# Patient Record
Sex: FEMALE | Race: WHITE | NOT HISPANIC OR LATINO | URBAN - METROPOLITAN AREA
[De-identification: names, ages, dates, MRNs, and addresses within clinical notes are randomized per-mention and may not be internally consistent; named-entity substitution may affect disease eponyms.]

---

## 2021-12-02 ENCOUNTER — OUTPATIENT (OUTPATIENT)
Dept: OUTPATIENT SERVICES | Facility: HOSPITAL | Age: 21
LOS: 1 days | Discharge: HOME | End: 2021-12-02

## 2022-07-19 RX ORDER — ACETAMINOPHEN 500 MG
1000 TABLET ORAL ONCE
Refills: 0 | Status: DISCONTINUED | OUTPATIENT
Start: 2022-07-20 | End: 2022-07-20

## 2022-07-19 RX ORDER — ESCITALOPRAM OXALATE 10 MG/1
1 TABLET, FILM COATED ORAL
Qty: 0 | Refills: 0 | DISCHARGE

## 2022-07-19 RX ORDER — APREPITANT 80 MG/1
40 CAPSULE ORAL ONCE
Refills: 0 | Status: DISCONTINUED | OUTPATIENT
Start: 2022-07-20 | End: 2022-07-20

## 2022-07-19 NOTE — ASU PATIENT PROFILE, ADULT - NSICDXPASTMEDICALHX_GEN_ALL_CORE_FT
PAST MEDICAL HISTORY:  Anxiety and depression      PAST MEDICAL HISTORY:  Anxiety and depression     Congenital deformity of face

## 2022-07-19 NOTE — ASU PATIENT PROFILE, ADULT - FALL HARM RISK - UNIVERSAL INTERVENTIONS
Bed in lowest position, wheels locked, appropriate side rails in place/Call bell, personal items and telephone in reach/Instruct patient to call for assistance before getting out of bed or chair/Non-slip footwear when patient is out of bed/[place] to call system/Physically safe environment - no spills, clutter or unnecessary equipment/Purposeful Proactive Rounding/Room/bathroom lighting operational, light cord in reach

## 2022-07-20 ENCOUNTER — OUTPATIENT (OUTPATIENT)
Dept: OUTPATIENT SERVICES | Facility: HOSPITAL | Age: 22
LOS: 1 days | Discharge: HOME | End: 2022-07-20

## 2022-07-20 VITALS
DIASTOLIC BLOOD PRESSURE: 67 MMHG | SYSTOLIC BLOOD PRESSURE: 103 MMHG | RESPIRATION RATE: 16 BRPM | HEART RATE: 80 BPM | OXYGEN SATURATION: 99 % | HEIGHT: 62 IN | WEIGHT: 131.4 LBS | TEMPERATURE: 98 F

## 2022-07-20 VITALS
HEART RATE: 90 BPM | RESPIRATION RATE: 14 BRPM | SYSTOLIC BLOOD PRESSURE: 103 MMHG | DIASTOLIC BLOOD PRESSURE: 71 MMHG | OXYGEN SATURATION: 100 %

## 2022-07-20 DIAGNOSIS — Q18.9 CONGENITAL MALFORMATION OF FACE AND NECK, UNSPECIFIED: Chronic | ICD-10-CM

## 2022-07-20 RX ORDER — FENTANYL CITRATE 50 UG/ML
25 INJECTION INTRAVENOUS
Refills: 0 | Status: DISCONTINUED | OUTPATIENT
Start: 2022-07-20 | End: 2022-07-20

## 2022-07-20 RX ORDER — SODIUM CHLORIDE 9 MG/ML
1000 INJECTION, SOLUTION INTRAVENOUS
Refills: 0 | Status: DISCONTINUED | OUTPATIENT
Start: 2022-07-20 | End: 2022-07-20

## 2022-07-20 RX ORDER — ONDANSETRON 8 MG/1
4 TABLET, FILM COATED ORAL ONCE
Refills: 0 | Status: DISCONTINUED | OUTPATIENT
Start: 2022-07-20 | End: 2022-07-20

## 2022-07-20 RX ORDER — KETOROLAC TROMETHAMINE 30 MG/ML
30 SYRINGE (ML) INJECTION ONCE
Refills: 0 | Status: DISCONTINUED | OUTPATIENT
Start: 2022-07-20 | End: 2022-07-20

## 2022-07-20 RX ADMIN — Medication 30 MILLIGRAM(S): at 19:59

## 2022-07-20 NOTE — BRIEF OPERATIVE NOTE - NSICDXBRIEFPROCEDURE_GEN_ALL_CORE_FT
PROCEDURES:  Repair, fistula, oronasal 20-Jul-2022 19:21:15  Christelle Us  Complex vestibuloplasty 20-Jul-2022 19:21:32  Christelle Us  Full-thickness skin graft to nose 20-Jul-2022 19:23:30  Christelle Us

## 2022-07-20 NOTE — BRIEF OPERATIVE NOTE - OPERATION/FINDINGS
[place] fistula repair, vestibuloplasty with full thickness skin graft from left post auricle.

## 2022-08-02 PROBLEM — Q18.9: Chronic | Status: ACTIVE | Noted: 2022-07-19

## 2022-08-02 PROBLEM — F41.9 ANXIETY DISORDER, UNSPECIFIED: Chronic | Status: ACTIVE | Noted: 2022-07-19

## 2022-09-06 NOTE — ASU DISCHARGE PLAN (ADULT/PEDIATRIC) - NS MD DC FALL RISK RISK
LOV 8/3/22  NOV none    Lyrica- 7/22/22 #90 caps with 1 refill    Xanax- 7/22/22 #120 tabs   For information on Fall & Injury Prevention, visit: [email or web address] OR  [email or web address] OR  https://www.cdc.gov/steadi/patient.html

## 2022-10-12 ENCOUNTER — OFFICE (OUTPATIENT)
Dept: URBAN - METROPOLITAN AREA CLINIC 28 | Facility: CLINIC | Age: 22
Setting detail: OPHTHALMOLOGY
End: 2022-10-12
Payer: COMMERCIAL

## 2022-10-12 DIAGNOSIS — H00.14: ICD-10-CM

## 2022-10-12 PROCEDURE — 92002 INTRM OPH EXAM NEW PATIENT: CPT | Performed by: OPHTHALMOLOGY

## 2022-10-12 ASSESSMENT — CONFRONTATIONAL VISUAL FIELD TEST (CVF)
OD_FINDINGS: FULL
OS_FINDINGS: FULL

## 2022-10-12 ASSESSMENT — TONOMETRY
OS_IOP_MMHG: 13
OD_IOP_MMHG: 13

## 2022-10-13 ASSESSMENT — AXIALLENGTH_DERIVED
OD_AL: 24.9813
OS_AL: 24.7173

## 2022-10-13 ASSESSMENT — REFRACTION_AUTOREFRACTION
OD_CYLINDER: -0.50
OD_SPHERE: -3.00
OS_AXIS: 175
OD_AXIS: 179
OS_SPHERE: -2.75
OS_CYLINDER: -0.25

## 2022-10-13 ASSESSMENT — KERATOMETRY
OD_K2POWER_DIOPTERS: 43.75
OS_K1POWER_DIOPTERS: 43.25
OS_K2POWER_DIOPTERS: 44.00
OD_AXISANGLE_DEGREES: 093
OD_K1POWER_DIOPTERS: 43.00
OS_AXISANGLE_DEGREES: 081

## 2022-10-13 ASSESSMENT — VISUAL ACUITY
OS_BCVA: 20/20
OD_BCVA: 20/20

## 2022-10-13 ASSESSMENT — SPHEQUIV_DERIVED
OD_SPHEQUIV: -3.25
OS_SPHEQUIV: -2.875

## 2023-02-14 NOTE — ASU PATIENT PROFILE, ADULT - NS PREOP UNDERSTANDS INFO
No solid food/dairy/candy/gum after 9:30pm tonight, water allowed before 04:30am tomorrow morning  Patient to bring photo ID/insurance card; no jewelries/valuable/contact lens; dress in comfortable clothes; no smoking/alcohol drinking/drug today; escort must have a photo ID; address and call back number was given./yes

## 2023-02-14 NOTE — ASU PATIENT PROFILE, ADULT - NSICDXPASTSURGICALHX_GEN_ALL_CORE_FT
Internal Medicine PAST SURGICAL HISTORY:  Deformity, face, congenital Multiple surgeries    S/P nasal surgery internal nasal repair

## 2023-02-15 ENCOUNTER — OUTPATIENT (OUTPATIENT)
Dept: OUTPATIENT SERVICES | Facility: HOSPITAL | Age: 23
LOS: 1 days | Discharge: HOME | End: 2023-02-15

## 2023-02-15 VITALS
HEIGHT: 62 IN | DIASTOLIC BLOOD PRESSURE: 72 MMHG | SYSTOLIC BLOOD PRESSURE: 107 MMHG | OXYGEN SATURATION: 100 % | HEART RATE: 82 BPM | RESPIRATION RATE: 16 BRPM | TEMPERATURE: 97 F | WEIGHT: 132.28 LBS

## 2023-02-15 VITALS
HEART RATE: 79 BPM | SYSTOLIC BLOOD PRESSURE: 99 MMHG | DIASTOLIC BLOOD PRESSURE: 72 MMHG | OXYGEN SATURATION: 99 % | TEMPERATURE: 98 F | RESPIRATION RATE: 16 BRPM

## 2023-02-15 DIAGNOSIS — Q18.9 CONGENITAL MALFORMATION OF FACE AND NECK, UNSPECIFIED: Chronic | ICD-10-CM

## 2023-02-15 DIAGNOSIS — Z98.890 OTHER SPECIFIED POSTPROCEDURAL STATES: Chronic | ICD-10-CM

## 2023-02-15 RX ORDER — ONDANSETRON 8 MG/1
4 TABLET, FILM COATED ORAL ONCE
Refills: 0 | Status: DISCONTINUED | OUTPATIENT
Start: 2023-02-15 | End: 2023-02-15

## 2023-02-15 RX ORDER — ACETAMINOPHEN 500 MG
1000 TABLET ORAL ONCE
Refills: 0 | Status: COMPLETED | OUTPATIENT
Start: 2023-02-15 | End: 2023-02-15

## 2023-02-15 RX ORDER — ESCITALOPRAM OXALATE 10 MG/1
15 TABLET, FILM COATED ORAL
Qty: 0 | Refills: 0 | DISCHARGE

## 2023-02-15 RX ORDER — SODIUM CHLORIDE 9 MG/ML
1000 INJECTION, SOLUTION INTRAVENOUS
Refills: 0 | Status: DISCONTINUED | OUTPATIENT
Start: 2023-02-15 | End: 2023-02-15

## 2023-02-15 RX ORDER — APREPITANT 80 MG/1
40 CAPSULE ORAL ONCE
Refills: 0 | Status: COMPLETED | OUTPATIENT
Start: 2023-02-15 | End: 2023-02-15

## 2023-02-15 RX ORDER — FENTANYL CITRATE 50 UG/ML
25 INJECTION INTRAVENOUS
Refills: 0 | Status: DISCONTINUED | OUTPATIENT
Start: 2023-02-15 | End: 2023-02-15

## 2023-02-15 RX ADMIN — Medication 1000 MILLIGRAM(S): at 07:13

## 2023-02-15 RX ADMIN — APREPITANT 40 MILLIGRAM(S): 80 CAPSULE ORAL at 07:14

## 2023-02-15 NOTE — PRE-ANESTHESIA EVALUATION ADULT - NSANTHOSAYNRD_GEN_A_CORE
No. KASSY screening performed.  STOP BANG Legend: 0-2 = LOW Risk; 3-4 = INTERMEDIATE Risk; 5-8 = HIGH Risk

## 2023-02-15 NOTE — ASU PREOP CHECKLIST - IDENTIFICATION BAND VERIFIED
done

## 2023-02-15 NOTE — ASU DISCHARGE PLAN (ADULT/PEDIATRIC) - NS MD DC FALL RISK RISK
For information on Fall & Injury Prevention, visit: [email or web address] OR  [email or web address] OR  https://www.cdc.gov/steadi/patient.html

## 2023-07-03 NOTE — ASU PREOP CHECKLIST - INTERNAL PROSTHESES

## 2023-08-10 NOTE — BRIEF OPERATIVE NOTE - NSICDXBRIEFPROCEDURE_GEN_ALL_CORE_FT
Anesthesia Evaluation     Patient summary reviewed   history of anesthetic complications:  prolonged sedation  NPO Solid Status: > 8 hours  NPO Liquid Status: > 8 hours           Airway   Mallampati: II  TM distance: >3 FB  Neck ROM: full  No difficulty expected  Dental      Comment: Multiple missing teeth    Pulmonary - negative pulmonary ROS   (-) asthma, sleep apnea, not a smoker  Cardiovascular     (+) hypertension 2 medications or greaterhyperlipidemia      Neuro/Psych  (-) seizures, TIA, CVA  GI/Hepatic/Renal/Endo    (+) obesity, GERD  (-) liver disease, no renal disease, diabetes    ROS Comment: On metformin for weight loss    Musculoskeletal     Abdominal    Substance History      OB/GYN          Other                      Anesthesia Plan    ASA 3     MAC     intravenous induction     Anesthetic plan, risks, benefits, and alternatives have been provided, discussed and informed consent has been obtained with: patient.    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

## 2023-11-22 NOTE — PACU DISCHARGE NOTE - NSPTMEETSDISCHCRITERIADT_GEN_A_CORE
6:45 AM Recheck on patient. Discussed with patient ED findings and plan for discharge. Patient was given ED warnings, discharge instructions, and follow up information to go home with. Patient understands and agrees with plan for discharge. Any questions have been answered.      
Bed: 03  Expected date:   Expected time:   Means of arrival:   Comments:  TRIAGE  
Pt back in room. Pt continues to endorse LLQ ABD pain    
Pt to CT w/ transport. No signs of distress.   
20-Jul-2022 20:54

## (undated) DEVICE — SUT VICRYL 4-0 27" RB-1 UNDYED

## (undated) DEVICE — DRSG MASTISOL

## (undated) DEVICE — WARMING BLANKET LOWER ADULT

## (undated) DEVICE — GLV 7.5 SENSICARE W ALOE

## (undated) DEVICE — PACK BLEPHAROPLASTY

## (undated) DEVICE — DRSG TEGADERM 2.5X3"

## (undated) DEVICE — BLADE SURGICAL #10 CARBON

## (undated) DEVICE — DRSG GAUZE SPONGE 2X2" STERILE

## (undated) DEVICE — PACK RHINOPLASTY

## (undated) DEVICE — SUT PDS II 3-0 18" PS-2 UNDYED

## (undated) DEVICE — SUT SILK 5-0 18" P-3

## (undated) DEVICE — PREP BETADINE SPONGE STICKS

## (undated) DEVICE — DRAPE MAYO STAND 23"

## (undated) DEVICE — SUT VICRYL 6-0 18" PS-6 UNDYED

## (undated) DEVICE — SYR LUER LOK 3CC

## (undated) DEVICE — DRSG STERISTRIPS 0.5 X 4"

## (undated) DEVICE — ELCTR BOVIE TIP BLADE INSULATED 2.8" EDGE WITH SAFETY

## (undated) DEVICE — SUT VICRYL 4-0 18" P-3 UNDYED

## (undated) DEVICE — Device

## (undated) DEVICE — SUT ETHILON 5-0 18" P-3

## (undated) DEVICE — SUT VICRYL 3-0 27" RB-1 UNDYED

## (undated) DEVICE — DRAPE TOWEL 1000 SMALL 17" X 11"

## (undated) DEVICE — URETERAL CATH RED RUBBER 14FR (GREEN)

## (undated) DEVICE — SOL ANTI FOG

## (undated) DEVICE — SUT VICRYL 4-0 18" PS-2 UNDYED

## (undated) DEVICE — SUT CHROMIC 5-0 18" S-14

## (undated) DEVICE — DRSG AQUAPLAST BLANK BLUSH 3 X 3"

## (undated) DEVICE — BEAVER BLADE MINI (ORANGE)

## (undated) DEVICE — SLV COMPRESSION KNEE MED

## (undated) DEVICE — SUT PDS II 5-0 18" P-3 UNDYED

## (undated) DEVICE — SUT PDS II 4-0 18" PS-2 UNDYED

## (undated) DEVICE — VENODYNE/SCD SLEEVE CALF MEDIUM

## (undated) DEVICE — SUT VICRYL 3-0 27" PS-2 UNDYED

## (undated) DEVICE — ELCTR BOVIE PENCIL BLADE 10FT

## (undated) DEVICE — SUT MONOCRYL 5-0 18" P-3 UNDYED

## (undated) DEVICE — SUT PLAIN GUT 4-0 18" P-3

## (undated) DEVICE — NDL HYPO REGULAR BEVEL 25G X 1.5" (BLUE)

## (undated) DEVICE — PACK EENT

## (undated) DEVICE — ELCTR COLORADO 3CM

## (undated) DEVICE — SUT VICRYL 5-0 18" P-3 UNDYED

## (undated) DEVICE — BLADE MULTI SIDED MICROSCLECTROMY

## (undated) DEVICE — DRAPE TOWEL BLUE 17" X 24"

## (undated) DEVICE — NDL HYPO REGULAR BEVEL 30G X 1" (BEIGE)

## (undated) DEVICE — TUBING SUCTION NONCONDUCTIVE 6MM X 12FT

## (undated) DEVICE — WARMING BLANKET LOWER PEDS

## (undated) DEVICE — SUT CHROMIC 4-0 27" RB-1

## (undated) DEVICE — PETRI DISH MED 3.5"

## (undated) DEVICE — DRSG TEGADERM 4X4.75"

## (undated) DEVICE — POLISHER OR CAUTERY TIP

## (undated) DEVICE — STAPLER SKIN VISI-STAT 35 WIDE

## (undated) DEVICE — SUT PLAIN GUT FAST ABSORBING 5-0 PC-1

## (undated) DEVICE — APPLICATOR COTTON TIP 6"

## (undated) DEVICE — BLADE TYMPANOPLASTY 2.5MM W 60 DEGREE BEVEL DOWN

## (undated) DEVICE — SUT ETHILON 7-0 18" P-1

## (undated) DEVICE — DRSG TELFA 3 X 8